# Patient Record
Sex: FEMALE | Race: WHITE | HISPANIC OR LATINO | Employment: FULL TIME | URBAN - METROPOLITAN AREA
[De-identification: names, ages, dates, MRNs, and addresses within clinical notes are randomized per-mention and may not be internally consistent; named-entity substitution may affect disease eponyms.]

---

## 2018-03-25 ENCOUNTER — OFFICE VISIT (OUTPATIENT)
Dept: URGENT CARE | Facility: PHYSICIAN GROUP | Age: 43
End: 2018-03-25
Payer: COMMERCIAL

## 2018-03-25 VITALS
BODY MASS INDEX: 22.36 KG/M2 | OXYGEN SATURATION: 100 % | SYSTOLIC BLOOD PRESSURE: 110 MMHG | RESPIRATION RATE: 16 BRPM | WEIGHT: 131 LBS | TEMPERATURE: 97.5 F | HEART RATE: 95 BPM | HEIGHT: 64 IN | DIASTOLIC BLOOD PRESSURE: 62 MMHG

## 2018-03-25 DIAGNOSIS — J02.0 STREP PHARYNGITIS: ICD-10-CM

## 2018-03-25 PROCEDURE — 99214 OFFICE O/P EST MOD 30 MIN: CPT | Performed by: FAMILY MEDICINE

## 2018-03-25 RX ORDER — AMOXICILLIN 500 MG/1
500 CAPSULE ORAL 2 TIMES DAILY
Qty: 20 CAP | Refills: 0 | Status: SHIPPED | OUTPATIENT
Start: 2018-03-25 | End: 2018-04-04

## 2018-03-25 RX ORDER — LEVOTHYROXINE SODIUM 112 UG/1
112 TABLET ORAL
COMMUNITY
End: 2019-12-16

## 2018-03-25 RX ORDER — LEVOTHYROXINE SODIUM 112 UG/1
112 TABLET ORAL
COMMUNITY

## 2018-03-25 ASSESSMENT — ENCOUNTER SYMPTOMS
VOMITING: 0
SPUTUM PRODUCTION: 0
EYE DISCHARGE: 0
ABDOMINAL PAIN: 0
CHILLS: 1
EYE REDNESS: 0
SINUS PAIN: 0
MYALGIAS: 0
NAUSEA: 0
COUGH: 0
SHORTNESS OF BREATH: 0
PALPITATIONS: 0
SORE THROAT: 1
FEVER: 1

## 2018-03-25 NOTE — LETTER
March 25, 2018         Patient: Grisel Walsh   YOB: 1975   Date of Visit: 3/25/2018           To Whom it May Concern:    Grisel Walsh was seen in my clinic on 3/25/2018. She may return to work on 3/27/18.    If you have any questions or concerns, please don't hesitate to call.        Sincerely,           Barbara Rios M.D.  Electronically Signed

## 2018-03-25 NOTE — PATIENT INSTRUCTIONS
Strep Infections  Streptococcal (strep) infections are caused by streptococcal germs (bacteria). Strep infections are very contagious. Strep infections can occur in:  · Ears.   · The nose.   · The throat.   · Sinuses.   · Skin.   · Blood.   · Lungs.   · Spinal fluid.   · Urine.   Strep throat is the most common bacterial infection in children. The symptoms of a Strep infection usually get better in 2 to 3 days after starting medicine that kills germs (antibiotics). Strep is usually not contagious after 36 to 48 hours of antibiotic treatment. Strep infections that are not treated can cause serious complications. These include gland infections, throat abscess, rheumatic fever and kidney disease.  DIAGNOSIS   The diagnosis of strep is made by:  · A culture for the strep germ.   TREATMENT   These infections require oral antibiotics for a full 10 days, an antibiotic shot or antibiotics given into the vein (intravenous, IV).  HOME CARE INSTRUCTIONS   · Be sure to finish all antibiotics even if feeling better.   · Only take over-the-counter medicines for pain, discomfort and or fever, as directed by your caregiver.   · Close contacts that have a fever, sore throat or illness symptoms should see their caregiver right away.   · You or your child may return to work, school or  if the fever and pain are better in 2 to 3 days after starting antibiotics.   SEEK MEDICAL CARE IF:   · You or your child has an oral temperature above 102° F (38.9° C).   · Your baby is older than 3 months with a rectal temperature of 100.5° F (38.1° C) or higher for more than 1 day.   · You or your child is not better in 3 days.   SEEK IMMEDIATE MEDICAL CARE IF:   · You or your child has an oral temperature above 102° F (38.9° C), not controlled by medicine.   · Your baby is older than 3 months with a rectal temperature of 102° F (38.9° C) or higher.   · Your baby is 3 months old or younger with a rectal temperature of 100.4° F (38° C) or  higher.   · There is a spreading rash.   · There is difficulty swallowing or breathing.   · There is increased pain or swelling.   Document Released: 01/25/2006 Document Revised: 03/11/2013 Document Reviewed: 11/03/2010  [employer]® Patient Information ©2013 [employer].

## 2018-03-25 NOTE — PROGRESS NOTES
"Subjective:      Grisel Walsh is a 42 y.o. female who presents with Pharyngitis (x1day, sore throat fever, chills)            Patient presents with:  Pharyngitis: x1day, sore throat fever, chills      HISTORY OF PRESENT ILLNESS: Patient is a 42 y.o. female who presents today due to complaints of a sore throat for the past 1 days. Reports associated fever of 101.2, chills, pain with swallowing. Pain is currently rated 6/10. Denies associated congestion, cough, or difficulty breathing. They have tried OTC medications at home without much improvement. Denies known ill contacts. Denies hx of recurrent strep throats    There are no active problems to display for this patient.      Allergies:Patient has no allergy information on record.    No current Track-ordered outpatient prescriptions on file.  No current Track-ordered facility-administered medications on file.       No past medical history on file.    Tobacco/Alcohol use not on file    No family status information on file.  No family history on file.                .             Review of Systems   Constitutional: Positive for chills and fever. Negative for malaise/fatigue.   HENT: Positive for ear pain and sore throat. Negative for congestion and sinus pain.    Eyes: Negative for discharge and redness.   Respiratory: Negative for cough, sputum production and shortness of breath.    Cardiovascular: Negative for chest pain and palpitations.   Gastrointestinal: Negative for abdominal pain, nausea and vomiting.   Genitourinary: Negative for dysuria, frequency and urgency.   Musculoskeletal: Negative for myalgias.   Skin: Negative for rash.          Objective:     /62   Pulse 95   Temp 36.4 °C (97.5 °F)   Resp 16   Ht 1.626 m (5' 4\")   Wt 59.4 kg (131 lb)   SpO2 100%   BMI 22.49 kg/m²      Physical Exam   Constitutional: She is oriented to person, place, and time. She appears well-developed and well-nourished.   HENT:   Head: Normocephalic and atraumatic. "   Right Ear: External ear normal.   Left Ear: External ear normal.   Mouth/Throat: Posterior oropharyngeal erythema present. Tonsils are 1+ on the right. Tonsils are 1+ on the left. Tonsillar exudate.   Eyes: EOM are normal. Pupils are equal, round, and reactive to light. Right eye exhibits no discharge. Left eye exhibits no discharge.   Neck: Normal range of motion. Neck supple.   malampatti 4   Cardiovascular: Normal rate, regular rhythm and normal heart sounds.    Pulmonary/Chest: Effort normal and breath sounds normal. No respiratory distress. She has no wheezes.   Abdominal: Soft. Bowel sounds are normal.   Lymphadenopathy:     She has cervical adenopathy.   Neurological: She is alert and oriented to person, place, and time.   Skin: Skin is warm and dry.               Assessment/Plan:       Assessment/Plan:  1. Strep pharyngitis  amoxicillin (AMOXIL) 500 MG Cap           POC strep: positive      Antibiotic as directed. OTC motrin or tylenol for pain/fever control. Hand hygiene. Increase fluid intake, rest. Warm salt water gargles.   Supportive care, differential diagnoses, and indications for immediate follow-up discussed with patient.   Pathogenesis of diagnosis discussed including typical length and natural progression.   Instructed to return to clinic or nearest emergency department for any change in condition, further concerns, or worsening of symptoms.  Patient states understanding of the plan of care and discharge instructions.  Instructed to make an appointment, for follow up, with their primary care provider.

## 2019-12-16 RX ORDER — BUDESONIDE 0.5 MG/2ML
500 INHALANT ORAL 2 TIMES DAILY
COMMUNITY

## 2019-12-16 SDOH — HEALTH STABILITY: MENTAL HEALTH: HOW OFTEN DO YOU HAVE A DRINK CONTAINING ALCOHOL?: NOT ASKED

## 2019-12-30 ENCOUNTER — HOSPITAL ENCOUNTER (OUTPATIENT)
Facility: MEDICAL CENTER | Age: 44
End: 2019-12-30
Attending: OPHTHALMOLOGY | Admitting: OPHTHALMOLOGY
Payer: COMMERCIAL

## 2019-12-30 ENCOUNTER — ANESTHESIA EVENT (OUTPATIENT)
Dept: SURGERY | Facility: MEDICAL CENTER | Age: 44
End: 2019-12-30
Payer: COMMERCIAL

## 2019-12-30 ENCOUNTER — ANESTHESIA (OUTPATIENT)
Dept: SURGERY | Facility: MEDICAL CENTER | Age: 44
End: 2019-12-30
Payer: COMMERCIAL

## 2019-12-30 VITALS
WEIGHT: 131.84 LBS | DIASTOLIC BLOOD PRESSURE: 55 MMHG | BODY MASS INDEX: 22.51 KG/M2 | HEART RATE: 85 BPM | TEMPERATURE: 97.4 F | RESPIRATION RATE: 16 BRPM | HEIGHT: 64 IN | OXYGEN SATURATION: 98 % | SYSTOLIC BLOOD PRESSURE: 91 MMHG

## 2019-12-30 LAB
B-HCG FREE SERPL-ACNC: <5 MIU/ML
IHCGL IHCGL: NEGATIVE MIU/ML

## 2019-12-30 PROCEDURE — 501326 HCHG SET, CANALIARLUS INTUBATION-EYE: Performed by: OPHTHALMOLOGY

## 2019-12-30 PROCEDURE — 160025 RECOVERY II MINUTES (STATS): Performed by: OPHTHALMOLOGY

## 2019-12-30 PROCEDURE — 700111 HCHG RX REV CODE 636 W/ 250 OVERRIDE (IP)

## 2019-12-30 PROCEDURE — 700105 HCHG RX REV CODE 258: Performed by: OPHTHALMOLOGY

## 2019-12-30 PROCEDURE — 700102 HCHG RX REV CODE 250 W/ 637 OVERRIDE(OP)

## 2019-12-30 PROCEDURE — 500331 HCHG COTTONOID, SURG PATTIE: Performed by: OPHTHALMOLOGY

## 2019-12-30 PROCEDURE — 700111 HCHG RX REV CODE 636 W/ 250 OVERRIDE (IP): Performed by: OPHTHALMOLOGY

## 2019-12-30 PROCEDURE — 160029 HCHG SURGERY MINUTES - 1ST 30 MINS LEVEL 4: Performed by: OPHTHALMOLOGY

## 2019-12-30 PROCEDURE — 84702 CHORIONIC GONADOTROPIN TEST: CPT

## 2019-12-30 PROCEDURE — 700102 HCHG RX REV CODE 250 W/ 637 OVERRIDE(OP): Performed by: OPHTHALMOLOGY

## 2019-12-30 PROCEDURE — C9046 COCAINE HCL NASAL SOLUTION: HCPCS | Performed by: OTOLARYNGOLOGY

## 2019-12-30 PROCEDURE — 160002 HCHG RECOVERY MINUTES (STAT): Performed by: OPHTHALMOLOGY

## 2019-12-30 PROCEDURE — 500558 HCHG EYE SHIELD W/GARTER (FOX): Performed by: OPHTHALMOLOGY

## 2019-12-30 PROCEDURE — 502240 HCHG MISC OR SUPPLY RC 0272: Performed by: OPHTHALMOLOGY

## 2019-12-30 PROCEDURE — 700101 HCHG RX REV CODE 250: Performed by: OTOLARYNGOLOGY

## 2019-12-30 PROCEDURE — A9270 NON-COVERED ITEM OR SERVICE: HCPCS

## 2019-12-30 PROCEDURE — 160048 HCHG OR STATISTICAL LEVEL 1-5: Performed by: OPHTHALMOLOGY

## 2019-12-30 PROCEDURE — A9270 NON-COVERED ITEM OR SERVICE: HCPCS | Performed by: OPHTHALMOLOGY

## 2019-12-30 PROCEDURE — 160035 HCHG PACU - 1ST 60 MINS PHASE I: Performed by: OPHTHALMOLOGY

## 2019-12-30 PROCEDURE — 160041 HCHG SURGERY MINUTES - EA ADDL 1 MIN LEVEL 4: Performed by: OPHTHALMOLOGY

## 2019-12-30 PROCEDURE — 500145 HCHG CANNULA, ANTERIOR CHAMBER RND*: Performed by: OPHTHALMOLOGY

## 2019-12-30 PROCEDURE — 160046 HCHG PACU - 1ST 60 MINS PHASE II: Performed by: OPHTHALMOLOGY

## 2019-12-30 PROCEDURE — 160009 HCHG ANES TIME/MIN: Performed by: OPHTHALMOLOGY

## 2019-12-30 PROCEDURE — 501838 HCHG SUTURE GENERAL: Performed by: OPHTHALMOLOGY

## 2019-12-30 PROCEDURE — 700101 HCHG RX REV CODE 250: Performed by: OPHTHALMOLOGY

## 2019-12-30 PROCEDURE — 700101 HCHG RX REV CODE 250

## 2019-12-30 RX ORDER — ONDANSETRON 2 MG/ML
4 INJECTION INTRAMUSCULAR; INTRAVENOUS
Status: COMPLETED | OUTPATIENT
Start: 2019-12-30 | End: 2019-12-30

## 2019-12-30 RX ORDER — TRIAMCINOLONE ACETONIDE 40 MG/ML
INJECTION, SUSPENSION INTRA-ARTICULAR; INTRAMUSCULAR
Status: DISCONTINUED | OUTPATIENT
Start: 2019-12-30 | End: 2019-12-30 | Stop reason: HOSPADM

## 2019-12-30 RX ORDER — LIDOCAINE HYDROCHLORIDE AND EPINEPHRINE BITARTRATE 20; .01 MG/ML; MG/ML
INJECTION, SOLUTION SUBCUTANEOUS
Status: DISCONTINUED
Start: 2019-12-30 | End: 2019-12-30 | Stop reason: HOSPADM

## 2019-12-30 RX ORDER — OXYMETAZOLINE HYDROCHLORIDE 0.05 G/100ML
SPRAY NASAL
Status: DISCONTINUED | OUTPATIENT
Start: 2019-12-30 | End: 2019-12-30 | Stop reason: HOSPADM

## 2019-12-30 RX ORDER — ONDANSETRON 2 MG/ML
INJECTION INTRAMUSCULAR; INTRAVENOUS PRN
Status: DISCONTINUED | OUTPATIENT
Start: 2019-12-30 | End: 2019-12-30 | Stop reason: SURG

## 2019-12-30 RX ORDER — SODIUM CHLORIDE, SODIUM LACTATE, POTASSIUM CHLORIDE, CALCIUM CHLORIDE 600; 310; 30; 20 MG/100ML; MG/100ML; MG/100ML; MG/100ML
INJECTION, SOLUTION INTRAVENOUS CONTINUOUS
Status: DISCONTINUED | OUTPATIENT
Start: 2019-12-30 | End: 2019-12-30 | Stop reason: HOSPADM

## 2019-12-30 RX ORDER — TETRACAINE HYDROCHLORIDE 5 MG/ML
SOLUTION OPHTHALMIC
Status: DISCONTINUED
Start: 2019-12-30 | End: 2019-12-30 | Stop reason: HOSPADM

## 2019-12-30 RX ORDER — OXYCODONE HYDROCHLORIDE AND ACETAMINOPHEN 5; 325 MG/1; MG/1
1 TABLET ORAL
Status: COMPLETED | OUTPATIENT
Start: 2019-12-30 | End: 2019-12-30

## 2019-12-30 RX ORDER — OXYCODONE HYDROCHLORIDE AND ACETAMINOPHEN 5; 325 MG/1; MG/1
2 TABLET ORAL
Status: COMPLETED | OUTPATIENT
Start: 2019-12-30 | End: 2019-12-30

## 2019-12-30 RX ORDER — NEOMYCIN SULFATE, POLYMYXIN B SULFATE AND DEXAMETHASONE 3.5; 10000; 1 MG/ML; [USP'U]/ML; MG/ML
SUSPENSION/ DROPS OPHTHALMIC
Status: DISCONTINUED
Start: 2019-12-30 | End: 2019-12-30 | Stop reason: HOSPADM

## 2019-12-30 RX ORDER — ERYTHROMYCIN 5 MG/G
OINTMENT OPHTHALMIC
Status: DISCONTINUED | OUTPATIENT
Start: 2019-12-30 | End: 2019-12-30 | Stop reason: HOSPADM

## 2019-12-30 RX ORDER — DIPHENHYDRAMINE HYDROCHLORIDE 50 MG/ML
12.5 INJECTION INTRAMUSCULAR; INTRAVENOUS
Status: DISCONTINUED | OUTPATIENT
Start: 2019-12-30 | End: 2019-12-30 | Stop reason: HOSPADM

## 2019-12-30 RX ORDER — OXYMETAZOLINE HYDROCHLORIDE 0.05 G/100ML
SPRAY NASAL
Status: DISCONTINUED
Start: 2019-12-30 | End: 2019-12-30 | Stop reason: HOSPADM

## 2019-12-30 RX ORDER — DEXAMETHASONE SODIUM PHOSPHATE 4 MG/ML
INJECTION, SOLUTION INTRA-ARTICULAR; INTRALESIONAL; INTRAMUSCULAR; INTRAVENOUS; SOFT TISSUE PRN
Status: DISCONTINUED | OUTPATIENT
Start: 2019-12-30 | End: 2019-12-30 | Stop reason: SURG

## 2019-12-30 RX ORDER — LIDOCAINE HYDROCHLORIDE AND EPINEPHRINE BITARTRATE 20; .01 MG/ML; MG/ML
INJECTION, SOLUTION SUBCUTANEOUS
Status: DISCONTINUED | OUTPATIENT
Start: 2019-12-30 | End: 2019-12-30 | Stop reason: HOSPADM

## 2019-12-30 RX ORDER — HALOPERIDOL 5 MG/ML
1 INJECTION INTRAMUSCULAR
Status: DISCONTINUED | OUTPATIENT
Start: 2019-12-30 | End: 2019-12-30 | Stop reason: HOSPADM

## 2019-12-30 RX ORDER — TRIAMCINOLONE ACETONIDE 40 MG/ML
INJECTION, SUSPENSION INTRA-ARTICULAR; INTRAMUSCULAR
Status: DISCONTINUED
Start: 2019-12-30 | End: 2019-12-30 | Stop reason: HOSPADM

## 2019-12-30 RX ORDER — CEFAZOLIN SODIUM 1 G/3ML
INJECTION, POWDER, FOR SOLUTION INTRAMUSCULAR; INTRAVENOUS PRN
Status: DISCONTINUED | OUTPATIENT
Start: 2019-12-30 | End: 2019-12-30 | Stop reason: SURG

## 2019-12-30 RX ORDER — TETRACAINE HYDROCHLORIDE 5 MG/ML
SOLUTION OPHTHALMIC
Status: DISCONTINUED | OUTPATIENT
Start: 2019-12-30 | End: 2019-12-30 | Stop reason: HOSPADM

## 2019-12-30 RX ORDER — METOCLOPRAMIDE HYDROCHLORIDE 5 MG/ML
INJECTION INTRAMUSCULAR; INTRAVENOUS PRN
Status: DISCONTINUED | OUTPATIENT
Start: 2019-12-30 | End: 2019-12-30 | Stop reason: SURG

## 2019-12-30 RX ORDER — ERYTHROMYCIN 5 MG/G
OINTMENT OPHTHALMIC
Status: DISCONTINUED
Start: 2019-12-30 | End: 2019-12-30 | Stop reason: HOSPADM

## 2019-12-30 RX ADMIN — ONDANSETRON 4 MG: 2 INJECTION INTRAMUSCULAR; INTRAVENOUS at 10:34

## 2019-12-30 RX ADMIN — SUGAMMADEX 200 MG: 100 INJECTION, SOLUTION INTRAVENOUS at 09:14

## 2019-12-30 RX ADMIN — ONDANSETRON 8 MG: 2 INJECTION INTRAMUSCULAR; INTRAVENOUS at 07:40

## 2019-12-30 RX ADMIN — METOCLOPRAMIDE 10 MG: 5 INJECTION, SOLUTION INTRAMUSCULAR; INTRAVENOUS at 07:40

## 2019-12-30 RX ADMIN — SODIUM CHLORIDE, POTASSIUM CHLORIDE, SODIUM LACTATE AND CALCIUM CHLORIDE: 600; 310; 30; 20 INJECTION, SOLUTION INTRAVENOUS at 07:39

## 2019-12-30 RX ADMIN — PROPOFOL 200 MG: 10 INJECTION, EMULSION INTRAVENOUS at 07:39

## 2019-12-30 RX ADMIN — CEFAZOLIN 2 G: 330 INJECTION, POWDER, FOR SOLUTION INTRAMUSCULAR; INTRAVENOUS at 07:39

## 2019-12-30 RX ADMIN — DEXAMETHASONE SODIUM PHOSPHATE 8 MG: 4 INJECTION, SOLUTION INTRA-ARTICULAR; INTRALESIONAL; INTRAMUSCULAR; INTRAVENOUS; SOFT TISSUE at 07:40

## 2019-12-30 RX ADMIN — ROCURONIUM BROMIDE 30 MG: 10 INJECTION, SOLUTION INTRAVENOUS at 07:40

## 2019-12-30 RX ADMIN — FENTANYL CITRATE 100 MCG: 50 INJECTION, SOLUTION INTRAMUSCULAR; INTRAVENOUS at 07:42

## 2019-12-30 RX ADMIN — FENTANYL CITRATE 25 MCG: 0.05 INJECTION, SOLUTION INTRAMUSCULAR; INTRAVENOUS at 09:29

## 2019-12-30 RX ADMIN — OXYCODONE HYDROCHLORIDE AND ACETAMINOPHEN 1 TABLET: 5; 325 TABLET ORAL at 09:28

## 2019-12-30 RX ADMIN — EPHEDRINE SULFATE 10 MG: 50 INJECTION INTRAMUSCULAR; INTRAVENOUS; SUBCUTANEOUS at 08:19

## 2019-12-30 RX ADMIN — FENTANYL CITRATE 25 MCG: 0.05 INJECTION, SOLUTION INTRAMUSCULAR; INTRAVENOUS at 09:31

## 2019-12-30 NOTE — OP REPORT
DATE OF SERVICE:  12/30/2019    ATTENDING SURGEON:  Rafael Forte MD    ASSISTANT SURGEON:  Lauro Caceres MD    ANESTHESIOLOGIST:  John Stark MD    ANESTHESIA:  General.    PREOPERATIVE DIAGNOSES:  1.  Nasolacrimal duct obstruction, left eye.  2.  Epiphora, left eye.  3.  Possible canalicular obstruction, left eye.    POSTOPERATIVE DIAGNOSES:  1.  Nasolacrimal duct obstruction, left eye.  2.  Epiphora, left eye.  3.  No evidence of canalicular obstruction, left eye.    PROCEDURE PERFORMED:  1.  Endoscopic dacryocystorhinostomy, left eye.  2.  Canalicular probing, left eye.  3.  Nasolacrimal stent placement, left.    SPECIMENS:  None.    IMPLANTS:  C-line stent to left nasolacrimal system.    COMPLICATIONS:  None.    ESTIMATED BLOOD LOSS:  Less than 5 mL.    INDICATIONS FOR PROCEDURE:  This is a 44-year-old female with a history of a   benign tumor of the maxillary sinus, who underwent endoscopic sinus surgery by   Dr. Caceres earlier this year.  Postoperatively, she developed distal   nasolacrimal duct obstruction causing Epiphora symptoms on the left side.  She   was referred for her tearing problem and on evaluation, she had physical   signs with the lacrimal drainage testing that confirmed complete nasolacrimal   duct obstruction and it also raised the question of possible canalicular   obstruction as well, but it was difficult in the clinic to assess this given   the patient's level of discomfort with any testing maneuvers.  Therefore, we   planned to go ahead with an endoscopic dacryocystorhinostomy with the   possibility of needing a Baldwin tube CDCR if there confirmed to be canalicular   obstruction upon canalicular probing intraoperatively.  Risks, benefits, and   alternatives were explained to the patient in detail and informed consent was   signed.    PROCEDURE IN DETAIL:  The patient was brought to the operating room and laid   supine on the operating table.  After induction of general endotracheal    anesthesia, the left side of the nose was packed with 4% cocaine-soaked cotton   pledgets.  The left periocular area and nose was then prepped and draped in   usual sterile fashion.  The packing material was removed from the nose and the   nasal structures were visualized with a 0-degree endoscope.  Because of the   prior sinus surgery, Dr. Caceres was on hand to provide guidance for the nasal   anatomy.  He helped to identify the location of the maxillary line relative to   the middle turbinate and therefore, this area was infiltrated with 2%   lidocaine with epinephrine as well as the septal mucosa.  Then, the nose was   packed with Afrin-soaked cotton pledgets.  The upper and lower lid puncta were   then dilated with a punctal dilator.    Both the upper and lower lid canaliculi were then probed.  It was noted that   the canaliculus of the upper and lower eyelids on the left side were patent to   probing to a hard stop at the level of the sac.  Therefore, it was concluded   that the patient did not have canalicular obstruction, but only had signs of   nasolacrimal duct obstruction.    Attention was turned back to performing the dacryocystorhinostomy.  The   packing material was removed.  A, 30-degree scope was used to visualize the   nasal mucosa.  Then, a curved [place] blade was used to incise the nasal mucosa   laterally along the maxillary line starting at the root of the turbinate   extending inferiorly for about approximately 1 cm.  Then, a mucosal flap was   created by creating back cuts in anterior direction to create an anteriorly   based mucosal flap.  This was elevated with a [place] elevator and then excised   using 90-degree Kerr[place] rongeur.  Posteriorly, the mucosa was somewhat   scarred due to prior to removal of the uncinate process during Dr. Caceres's   prior surgery.  This mucosa was elevated off of the lacrimal bone posteriorly.    The lacrimal bone was entered inferiorly using the blunt edge of  the   90-degree Kerr[place] rongeur and the bony ostomy was begun.    Bony ostomy was then extended anteriorly and posteriorly and superiorly all   the way to the root of the turbinate.  Portion of the agger nasi was also   removed in order to provide access to removing the bone just lateral to the   bony ostomy.  A lacrimal probe was placed into the canaliculus of the upper   eyelid to probe and help tent up the lacrimal sac.  The lacrimal sac was   infiltrated with 2% lidocaine with epinephrine.  Then, it was noted that the   ostomy was not quite superior enough.  Additional bone was taken more   superiorly until there was clearance of the common canaliculus entrance.    Then, the lacrimal sac was opened along its vertical length using a [place]   blade while using a lacrimal probe to tent up the mucosa.  Back cuts were made   superiorly and inferiorly both in anterior and posterior direction.  Anterior   flap of the lacrimal sac was excised using a 90-degree [place] rongeur.  The   posterior flap was excised using a straight Hai-cut sinus instrument.  There was   excellent exposure of the common canaliculus entrance.    A C-line stent was then opened and one arm of the stent was placed through the   upper canalicular system through the ostomy and then out through the nose.    The other end through the lower canaliculus and out through the ostomy and out   through the nose.  The stent was then tied at the level of the nostril using   3 single throw knots.  The knot was then further secured with a 6-0 Prolene   suture, wrapped around it and tied.  Then, that suture was used to tack the   end of the stent to the nasal vestibule laterally just above the hair-bearing   area.  The nasopharynx was suctioned out.  There was no active bleeding within   the nose and the ostomy was dressed with a small piece of Gelfoam soaked in   Kenalog 40 mg. The patient was then reversed from anesthesia and brought to   PACU in stable  condition.  Antibiotic ointment was placed in the eye at the   conclusion of the case.       ____________________________________     MD Palmira Milner / DEEDEE    DD:  12/30/2019 09:39:12  DT:  12/30/2019 10:28:04    D#:  [id number]  Job#:  [id number]

## 2019-12-30 NOTE — ANESTHESIA POSTPROCEDURE EVALUATION
Patient: Grisel Walsh    Procedure Summary     Date:  12/30/19 Room / Location:  [facility] ROOM 24 / SURGERY SAME DAY [facility]    Anesthesia Start:  0739 Anesthesia Stop:  0925    Procedures:       SINUS SURGERY, ENDOSCOPIC, WITH DACRYOCYSTORHINOSTOMY-EXTRENAL (Left Nose)      PROBING, LACRIMAL DUCT-CANALICULAR, NASOLACRIMAL DUCT STENT (Left Eye) Diagnosis:  (EPIPHORA, NLDO CANALICULAR OBSTRUCTION)    Surgeon:  Rafael Forte M.D. Responsible Provider:  John Stark M.D.    Anesthesia Type:  general ASA Status:  2          Final Anesthesia Type: general  Last vitals  BP   Blood Pressure: (!) 97/52    Temp   36.3 °C (97.4 °F)    Pulse   Pulse: (!) 104   Resp   16    SpO2   100 %      Anesthesia Post Evaluation    Patient location during evaluation: PACU  Patient participation: complete - patient participated  Level of consciousness: awake and alert    Airway patency: patent  Anesthetic complications: no  Cardiovascular status: hemodynamically stable  Respiratory status: acceptable  Hydration status: euvolemic    PONV: none

## 2019-12-30 NOTE — OR NURSING
0922 Pt to PACU from OR. Report from anesthesia and OR RN. Pt arouses on calling, on 1L NC O2. Respirations even and unlabored. VSS. L eye w/o bleeding. C/o 4/10 pain, will medicate per MAR. Declines nausea.     0931 Medicated for 6/10 pain. Tolerating sips of water.     1000 Pain tolerable 4/10. Meets phase II criteria.    1034 Medicated for nausea after standing to get dressed.    1050 Discharge orders received. Meets discharge criteria at this time. Tolerable pain. No nausea. Tolerating PO. On RA. All lines and monitors discontinued. Reviewed discharge paperwork with pt and friend. Discussed diet, activity, medications, follow up care and worsening symptoms. No questions at this time. Pt to be discharged to home via private vehicle. Offered hospital escort and wc, pt declined, ambulated out of department with RN, friend, and all belongings.

## 2019-12-30 NOTE — ANESTHESIA TIME REPORT
Anesthesia Start and Stop Event Times     Date Time Event    12/30/2019 0720 Ready for Procedure     0739 Anesthesia Start     0925 Anesthesia Stop        Responsible Staff  12/30/19    Name Role Begin End    John Stark M.D. Anesth [id number]        Preop Diagnosis (Free Text):  Pre-op Diagnosis     EPIPHORA, NLDO CANALICULAR OBSTRUCTION        Preop Diagnosis (Codes):    Post op Diagnosis  Epiphora      Premium Reason  Non-Premium    Comments:

## 2019-12-30 NOTE — DISCHARGE INSTRUCTIONS
ACTIVITY: Rest and take it easy for the first 24 hours.  A responsible adult is recommended to remain with you during that time.  It is normal to feel sleepy.  We encourage you to not do anything that requires balance, judgment or coordination.    MILD FLU-LIKE SYMPTOMS ARE NORMAL. YOU MAY EXPERIENCE GENERALIZED MUSCLE ACHES, THROAT IRRITATION, HEADACHE AND/OR SOME NAUSEA.    FOR 24 HOURS DO NOT:  Drive, operate machinery or run household appliances.  Drink beer or alcoholic beverages.   Make important decisions or sign legal documents.    SPECIAL INSTRUCTIONS:     1. No nose blowing  2. No heavy lifting/bending/straining/stooping  3. No eye rubbing  4. Wear shield over Left eye every evening  5. Prescriptions: Maxitrol to operated eye 3 times/day                                Flonase nasal spray 3 times/day  6. Nasal Protocol: -Nasal saline rinse 3 times/day to Left nostril                                  -Flonase 3 times/day to Left nostril                                  -Afrin 3 times/day to Left nostril                                  -Stop Afrin in 2 days, but continue other sprays  7. Follow up with Dr. Forte  8. Elevate Head 30 degrees to reduce swelling    DIET: To avoid nausea, slowly advance diet as tolerated, avoiding spicy or greasy foods for the first day.  Add more substantial food to your diet according to your physician's instructions.  Babies can be fed formula or breast milk as soon as they are hungry.  INCREASE FLUIDS AND FIBER TO AVOID CONSTIPATION.    SURGICAL DRESSING/BATHING: Ok to shower in 24 hrs, avoid getting soap/water in L eye    FOLLOW-UP APPOINTMENT:  A follow-up appointment should be arranged with your doctor; call to schedule.    You should CALL YOUR PHYSICIAN if you develop:  Fever greater than 101 degrees F.  Pain not relieved by medication, or persistent nausea or vomiting.  Excessive bleeding (blood soaking through dressing) or unexpected drainage from the wound.  Extreme  redness or swelling around the incision site, drainage of pus or foul smelling drainage.  Inability to urinate or empty your bladder within 8 hours.  Problems with breathing or chest pain.    You should call 911 if you develop problems with breathing or chest pain.  If you are unable to contact your doctor or surgical center, you should go to the nearest emergency room or urgent care center.  Physician's telephone #: Dr. Forte [phone]    If any questions arise, call your doctor.  If your doctor is not available, please feel free to call the Surgical Center at [phone].  The Center is open Monday through Friday from 7AM to 7PM.  You can also call the HEALTH HOTLINE open 24 hours/day, 7 days/week and speak to a nurse at [phone], or toll free at [phone].    A registered nurse may call you a few days after your surgery to see how you are doing after your procedure.    MEDICATIONS: Resume taking daily medication.  Take prescribed pain medication with food.  If no medication is prescribed, you may take non-aspirin pain medication if needed.  PAIN MEDICATION CAN BE VERY CONSTIPATING.  Take a stool softener or laxative such as senokot, pericolace, or milk of magnesia if needed.    Prescription given for Maxitrol and Flonase.  Last pain medication given at 9:30 am (Percocet 5 mg).    If your physician has prescribed pain medication that includes Acetaminophen (Tylenol), do not take additional Acetaminophen (Tylenol) while taking the prescribed medication.    Depression / Suicide Risk    As you are discharged from this [facility] facility, it is important to learn how to keep safe from harming yourself.    Recognize the warning signs:  · Abrupt changes in personality, positive or negative- including increase in energy   · Giving away possessions  · Change in eating patterns- significant weight changes-  positive or negative  · Change in sleeping patterns- unable to sleep or sleeping all the  time   · Unwillingness or inability to communicate  · Depression  · Unusual sadness, discouragement and loneliness  · Talk of wanting to die  · Neglect of personal appearance   · Rebelliousness- reckless behavior  · Withdrawal from people/activities they love  · Confusion- inability to concentrate     If you or a loved one observes any of these behaviors or has concerns about self-harm, here's what you can do:  · Talk about it- your feelings and reasons for harming yourself  · Remove any means that you might use to hurt yourself (examples: pills, rope, extension cords, firearm)  · Get professional help from the community (Mental Health, Substance Abuse, psychological counseling)  · Do not be alone:Call your Safe Contact- someone whom you trust who will be there for you.  · Call your local CRISIS HOTLINE 874-2995 or 442-737-1589  · Call your local Children's Mobile Crisis Response Team [place] (602) 066-3277 or www.luxustravel.es  · Call the toll free National Suicide Prevention Hotlines   · National Suicide Prevention Lifeline 796-394-SDNO (8293)  · National Hope Line Network 800-SUICIDE (763-7003)

## 2019-12-30 NOTE — ANESTHESIA QCDR
2019 [facility] Clinical Data Registry (for Quality Improvement)     Postoperative nausea/vomiting risk protocol (Adult = 18 yrs and Pediatric 3-17 yrs)- (430 and 463)  General inhalation anesthetic (NOT TIVA) with PONV risk factors: Yes  Provision of anti-emetic therapy with at least 2 different classes of agents: Yes   Patient DID NOT receive anti-emetic therapy and reason is documented in Medical Record:  N/A    Multimodal Pain Management- (AQI59)  Patient undergoing Elective Surgery (i.e. Outpatient, or ASC, or Prescheduled Surgery prior to Hospital Admission): Yes  Use of Multimodal Pain Management, two or more drugs and/or interventions, NOT including systemic opioids: Yes   Exception: Documented allergy to multiple classes of analgesics:  N/A    PACU assessment of acute postoperative pain prior to Anesthesia Care End- Applies to Patients Age = 18- (ABG7)  Initial PACU pain score is which of the following: < 7/10  Patient unable to report pain score: N/A    Post-anesthetic transfer of care checklist/protocol to PACU/ICU- (426 and 427)  Upon conclusion of case, patient transferred to which of the following locations: PACU/Non-ICU  Use of transfer checklist/protocol: Yes  Exclusion: Service Performed in Patient Hospital Room (and thus did not require transfer): N/A    PACU Reintubation- (AQI31)  General anesthesia requiring endotracheal intubation (ETT) along with subsequent extubation in OR or PACU: No  Required reintubation in the PACU: N/A  Extubation was a planned trial documented in the medical record prior to removal of the original airway device: N/A    Unplanned admission to ICU related to anesthesia service up through end of PACU care- (MD51)  Unplanned admission to ICU (not initially anticipated at anesthesia start time): No

## (undated) DEVICE — PATTIES SURG X-RAYCOTTONOID - 1/2 X 3 IN (200/CA)

## (undated) DEVICE — TUBE TRACHEAL RAE 6.5MM (10EA/BX)

## (undated) DEVICE — CANISTER SUCTION RIGID RED 1500CC (40EA/CA)

## (undated) DEVICE — SUCTION INSTRUMENT YANKAUER BULBOUS TIP W/O VENT (50EA/CA)

## (undated) DEVICE — SUTURE 7-0 VICRYL TG140-8 (12PK/BX)

## (undated) DEVICE — SENSOR SPO2 NEO LNCS ADHESIVE (20/BX) SEE USER NOTES

## (undated) DEVICE — NEPTUNE 4 PORT MANIFOLD - (20/PK)

## (undated) DEVICE — TUBE CONNECTING SUCTION - CLEAR PLASTIC STERILE 72 IN (50EA/CA)

## (undated) DEVICE — Device

## (undated) DEVICE — GVL 3 STAT DISPOSABLE - (10/BX)

## (undated) DEVICE — GOWN SURGEONS X-LARGE - DISP. (30/CA)

## (undated) DEVICE — WATER IRRIGATION STERILE 1000ML (12EA/CA)

## (undated) DEVICE — CANNULA ANTERIOR CHAMBER RANDOLPH NEEDLE (10EA/BX)

## (undated) DEVICE — CANISTER SUCTION 3000ML MECHANICAL FILTER AUTO SHUTOFF MEDI-VAC NONSTERILE LF DISP  (40EA/CA)

## (undated) DEVICE — GLOVES, #7 1/2 BIOGEL M

## (undated) DEVICE — SET LEADWIRE 5 LEAD BEDSIDE DISPOSABLE ECG (1SET OF 5/EA)

## (undated) DEVICE — SHIELD OPTH AL GRTR CVR FOX (50EA/BX)

## (undated) DEVICE — MEDICINE CUP STERILE 2 OZ - (100/CA)

## (undated) DEVICE — SUTURE GENERAL

## (undated) DEVICE — SET EXTENSION WITH 2 PORTS (48EA/CA) ***PART #2C8610 IS A SUBSTITUTE*****

## (undated) DEVICE — SYRINGE 30 ML LL (56/BX)

## (undated) DEVICE — TUBING CLEARLINK DUO-VENT - C-FLO (48EA/CA)

## (undated) DEVICE — CATHETER IV 20 GA X 1-1/4 ---SURG.& SDS ONLY--- (50EA/BX)

## (undated) DEVICE — KIT  I.V. START (100EA/CA)

## (undated) DEVICE — ANTI-FOG SOLUTION - 60BTL/CA

## (undated) DEVICE — SODIUM CHL IRRIGATION 0.9% 1000ML (12EA/CA)

## (undated) DEVICE — LACTATED RINGERS INJ 1000 ML - (14EA/CA 60CA/PF)

## (undated) DEVICE — HEAD HOLDER JUNIOR/ADULT

## (undated) DEVICE — GLOVE BIOGEL INDICATOR SZ 7SURGICAL PF LTX - (50/BX 4BX/CA)

## (undated) DEVICE — KIT ANESTHESIA W/CIRCUIT & 3/LT BAG W/FILTER (20EA/CA)

## (undated) DEVICE — BLADE 60 DEGREE ANGLED SMOOTH (3EA/SP)

## (undated) DEVICE — GLOVE BIOGEL SZ 7 SURGICAL PF LTX - (50PR/BX 4BX/CA)

## (undated) DEVICE — ELECTRODE 850 FOAM ADHESIVE - HYDROGEL RADIOTRNSPRNT (50/PK)

## (undated) DEVICE — SUTURE 6-0 PROLENE P-3 - (12/BX)

## (undated) DEVICE — TUBE CONNECT SUCTION CLEAR 120 X 1/4" (50EA/CA)"

## (undated) DEVICE — SURGIFOAM (12X7) - (12EA/CA)

## (undated) DEVICE — PACK KO - (7/CA)